# Patient Record
Sex: FEMALE | Race: WHITE
[De-identification: names, ages, dates, MRNs, and addresses within clinical notes are randomized per-mention and may not be internally consistent; named-entity substitution may affect disease eponyms.]

---

## 2023-09-29 ENCOUNTER — RX ONLY (RX ONLY)
Age: 83
End: 2023-09-29

## 2023-09-29 ENCOUNTER — OFFICE (OUTPATIENT)
Facility: LOCATION | Age: 83
Setting detail: OPHTHALMOLOGY
End: 2023-09-29
Payer: MEDICARE

## 2023-09-29 DIAGNOSIS — H01.005: ICD-10-CM

## 2023-09-29 DIAGNOSIS — H01.004: ICD-10-CM

## 2023-09-29 DIAGNOSIS — H01.001: ICD-10-CM

## 2023-09-29 DIAGNOSIS — H01.002: ICD-10-CM

## 2023-09-29 DIAGNOSIS — H49.13: ICD-10-CM

## 2023-09-29 DIAGNOSIS — H11.153: ICD-10-CM

## 2023-09-29 DIAGNOSIS — E11.3293: ICD-10-CM

## 2023-09-29 DIAGNOSIS — H49.12: ICD-10-CM

## 2023-09-29 DIAGNOSIS — H16.223: ICD-10-CM

## 2023-09-29 DIAGNOSIS — H35.373: ICD-10-CM

## 2023-09-29 PROCEDURE — 92014 COMPRE OPH EXAM EST PT 1/>: CPT | Performed by: OPHTHALMOLOGY

## 2023-09-29 PROCEDURE — 92134 CPTRZ OPH DX IMG PST SGM RTA: CPT | Performed by: OPHTHALMOLOGY

## 2023-09-29 ASSESSMENT — CONFRONTATIONAL VISUAL FIELD TEST (CVF)
OS_FINDINGS: FULL
OD_FINDINGS: FULL

## 2023-09-29 ASSESSMENT — LID EXAM ASSESSMENTS
OS_DERMATOCHALASIS: 1+
OD_BLEPHARITIS: T
OD_DERMATOCHALASIS: 1+
OS_BLEPHARITIS: T

## 2023-09-29 ASSESSMENT — SUPERFICIAL PUNCTATE KERATITIS (SPK)
OS_SPK: T
OD_SPK: T

## 2023-10-03 PROBLEM — E11.3293: Status: ACTIVE | Noted: 2023-09-29

## 2023-10-04 PROBLEM — H01.004 BLEPHARITIS; RIGHT UPPER LID, RIGHT LOWER LID, LEFT UPPER LID, LEFT LOWER LID: Status: ACTIVE | Noted: 2023-09-29

## 2023-10-04 PROBLEM — H01.002 BLEPHARITIS; RIGHT UPPER LID, RIGHT LOWER LID, LEFT UPPER LID, LEFT LOWER LID: Status: ACTIVE | Noted: 2023-09-29

## 2023-10-04 PROBLEM — H01.001 BLEPHARITIS; RIGHT UPPER LID, RIGHT LOWER LID, LEFT UPPER LID, LEFT LOWER LID: Status: ACTIVE | Noted: 2023-09-29

## 2023-10-04 PROBLEM — H01.005 BLEPHARITIS; RIGHT UPPER LID, RIGHT LOWER LID, LEFT UPPER LID, LEFT LOWER LID: Status: ACTIVE | Noted: 2023-09-29

## 2023-10-07 ASSESSMENT — SPHEQUIV_DERIVED
OD_SPHEQUIV: 0.25
OS_SPHEQUIV: 0.5

## 2023-10-07 ASSESSMENT — REFRACTION_AUTOREFRACTION
OD_CYLINDER: +2.50
OS_AXIS: 167
OD_AXIS: 006
OS_SPHERE: -1.25
OD_SPHERE: -1.00
OS_CYLINDER: +3.50

## 2023-10-07 ASSESSMENT — VISUAL ACUITY
OD_BCVA: 20/60-1
OS_BCVA: 20/40-2

## 2024-02-08 PROBLEM — Z00.00 ENCOUNTER FOR PREVENTIVE HEALTH EXAMINATION: Status: ACTIVE | Noted: 2024-02-08

## 2024-02-12 ENCOUNTER — APPOINTMENT (OUTPATIENT)
Dept: ORTHOPEDIC SURGERY | Facility: CLINIC | Age: 84
End: 2024-02-12
Payer: MEDICARE

## 2024-02-12 VITALS — HEIGHT: 68 IN

## 2024-02-12 DIAGNOSIS — Z87.891 PERSONAL HISTORY OF NICOTINE DEPENDENCE: ICD-10-CM

## 2024-02-12 DIAGNOSIS — Z78.9 OTHER SPECIFIED HEALTH STATUS: ICD-10-CM

## 2024-02-12 DIAGNOSIS — E11.9 TYPE 2 DIABETES MELLITUS W/OUT COMPLICATIONS: ICD-10-CM

## 2024-02-12 DIAGNOSIS — G56.00 CARPAL TUNNEL SYNDROME, UNSPECIFIED UPPER LIMB: ICD-10-CM

## 2024-02-12 PROCEDURE — 99203 OFFICE O/P NEW LOW 30 MIN: CPT

## 2024-02-12 RX ORDER — EZETIMIBE 10 MG/1
10 TABLET ORAL
Refills: 0 | Status: ACTIVE | COMMUNITY

## 2024-02-12 RX ORDER — OLMESARTAN MEDOXOMIL-HYDROCHLOROTHIAZIDE 12.5; 4 MG/1; MG/1
40-12.5 TABLET, FILM COATED ORAL
Refills: 0 | Status: ACTIVE | COMMUNITY

## 2024-02-12 RX ORDER — DULAGLUTIDE 0.75 MG/.5ML
0.75 INJECTION, SOLUTION SUBCUTANEOUS
Refills: 0 | Status: ACTIVE | COMMUNITY

## 2024-02-12 RX ORDER — INSULIN LISPRO 100 [IU]/ML
INJECTION, SOLUTION INTRAVENOUS; SUBCUTANEOUS
Refills: 0 | Status: ACTIVE | COMMUNITY

## 2024-02-12 RX ORDER — CHLORHEXIDINE GLUCONATE 4 %
1000 LIQUID (ML) TOPICAL
Refills: 0 | Status: ACTIVE | COMMUNITY

## 2024-02-12 RX ORDER — ROSUVASTATIN CALCIUM 20 MG/1
20 TABLET, FILM COATED ORAL
Refills: 0 | Status: ACTIVE | COMMUNITY

## 2024-02-12 RX ORDER — PYRIDOSTIGMINE BROMIDE 60 MG/1
60 TABLET ORAL
Refills: 0 | Status: ACTIVE | COMMUNITY

## 2024-02-12 NOTE — ASSESSMENT
[FreeTextEntry1] : Bilateral CTS - reviewed pathoanatomy. Encouraged nighttime cockup wrist bracing and elbow extension bracing. Reviewed results of bilateral UE EMG/NCV bilateral CTS (per patient as no printed out copy present). Will continue with nighttime bracing and nonoperative intention.  F/u 6mo/prn

## 2024-02-12 NOTE — HISTORY OF PRESENT ILLNESS
[de-identified] : Age: 83 year F PMHx: DM, CTR on right wrist 2012 Hand Dominance: RHD Chief Complaint: Bilateral hand pain onset late 2023 with NKI. Patient reports numbness/tingling in her bilateral hands. Patient reports that she is not having any pain, only the numbness/tingling. Patient had an EMG test performed in October 2023 that showed carpal tunnel in both of her wrists (L>R). Patient had a carpal tunnel release in 2012 on her right wrist with relief.   Trauma: NKI Outside Imaging/Treatment: EMG from October 2023 OTC Medications: none OT/PT: HEP with relief Bracing: nighttime braces with relief Pain worse with: exertion Pain better with:  nighttime brace, rest

## 2024-02-12 NOTE — IMAGING
[de-identified] : Right wrist with no swelling nor erythema. Able to make fist, oppose thumb to small finger and abduct fingers, no overt atrophy. +Phalen's, +tinel at carpal, -tinel at [place], -tinel at cubital. -froment, -akshatberg. Decreased sensation in median and intact at superficial radial and intact in small and ulnar ring finger (normal at ulnar hand) prior to provocative testing. <2sec cap refill.    Left wrist with no swelling nor erythema. Able to make fist, oppose thumb to small finger and abduct fingers, no overt atrophy. +Phalen's, +tinel at carpal, -tinel at [place], -tinel at cubital. -froment, -akshatberg. Decreased sensation in median and intact at superficial radial and intact in small and ulnar ring finger (normal at ulnar hand) prior to provocative testing. <2sec cap refill.